# Patient Record
Sex: FEMALE | Race: WHITE | NOT HISPANIC OR LATINO | Employment: OTHER | URBAN - METROPOLITAN AREA
[De-identification: names, ages, dates, MRNs, and addresses within clinical notes are randomized per-mention and may not be internally consistent; named-entity substitution may affect disease eponyms.]

---

## 2018-09-13 PROBLEM — Z86.0100 HISTORY OF COLON POLYPS: Status: ACTIVE | Noted: 2018-09-13

## 2024-01-31 ENCOUNTER — OFFICE VISIT (OUTPATIENT)
Dept: URGENT CARE | Facility: CLINIC | Age: 79
End: 2024-01-31
Payer: MEDICARE

## 2024-01-31 VITALS
TEMPERATURE: 98 F | HEART RATE: 62 BPM | WEIGHT: 128.06 LBS | OXYGEN SATURATION: 99 % | RESPIRATION RATE: 19 BRPM | HEIGHT: 55 IN | SYSTOLIC BLOOD PRESSURE: 135 MMHG | DIASTOLIC BLOOD PRESSURE: 74 MMHG | BODY MASS INDEX: 29.64 KG/M2

## 2024-01-31 DIAGNOSIS — R39.9 UTI SYMPTOMS: Primary | ICD-10-CM

## 2024-01-31 LAB
BILIRUB UR QL STRIP: NEGATIVE
GLUCOSE UR QL STRIP: NEGATIVE
KETONES UR QL STRIP: NEGATIVE
LEUKOCYTE ESTERASE UR QL STRIP: NEGATIVE
PH, POC UA: 7.5 (ref 5–8)
POC BLOOD, URINE: NEGATIVE
POC NITRATES, URINE: NEGATIVE
PROT UR QL STRIP: NEGATIVE
SP GR UR STRIP: 1 (ref 1–1.03)
UROBILINOGEN UR STRIP-ACNC: NORMAL (ref 0.1–1.1)

## 2024-01-31 PROCEDURE — 81003 URINALYSIS AUTO W/O SCOPE: CPT | Mod: QW,S$GLB,, | Performed by: PHYSICIAN ASSISTANT

## 2024-01-31 PROCEDURE — 87086 URINE CULTURE/COLONY COUNT: CPT | Performed by: PHYSICIAN ASSISTANT

## 2024-01-31 PROCEDURE — 99213 OFFICE O/P EST LOW 20 MIN: CPT | Mod: S$GLB,,, | Performed by: PHYSICIAN ASSISTANT

## 2024-01-31 NOTE — PROGRESS NOTES
"Subjective:      Patient ID: Rehana Siddiqi is a 78 y.o. female.    Vitals:  height is 4' 6" (1.372 m) and weight is 58.1 kg (128 lb 1.4 oz). Her oral temperature is 97.9 °F (36.6 °C). Her blood pressure is 135/74 and her pulse is 62. Her respiration is 19 and oxygen saturation is 99%.     Chief Complaint: Dysuria    This is a 78 y.o. female who presents today with a chief complaint of odor in urine in the mornings for the last 3 weeks , patient daughter states her sodium level has been low and returns to primary in 3 weeks to get it checked . Denies flank pain, fever, nausea, confusion, frequency or urgency. She does admit that she does not drink much water as she does not like it    Dysuria   This is a new problem. The current episode started more than 1 month ago. Pertinent negatives include no flank pain, frequency, nausea, urgency or vomiting.       Constitution: Negative for fever.   Gastrointestinal:  Negative for nausea and vomiting.   Genitourinary:  Negative for dysuria, frequency, urgency and flank pain.        Urinary odor   Psychiatric/Behavioral:  Negative for confusion.       Objective:     Physical Exam   Constitutional: She is oriented to person, place, and time. She appears well-developed. She is cooperative.  Non-toxic appearance. She does not appear ill. No distress.   HENT:   Head: Normocephalic and atraumatic.   Ears:   Right Ear: Hearing normal.   Left Ear: Hearing normal.   Eyes: Conjunctivae and lids are normal. No scleral icterus.   Neck: Phonation normal. Neck supple.   Cardiovascular: Normal rate.   Pulmonary/Chest: Effort normal. No respiratory distress.   Abdominal: Normal appearance. She exhibits no distension and no mass. Soft. There is no abdominal tenderness. There is no rebound, no guarding, no left CVA tenderness and no right CVA tenderness. No hernia.   Neurological: She is alert and oriented to person, place, and time. Coordination normal.   Skin: Skin is intact, not " diaphoretic and not pale.   Psychiatric: Her speech is normal and behavior is normal. Judgment and thought content normal.   Nursing note and vitals reviewed.      Assessment:     1. UTI symptoms        Plan:       UTI symptoms  -     POCT Urinalysis, Dipstick, Automated, W/O Scope  -     CULTURE, URINE      Results for orders placed or performed in visit on 01/31/24   POCT Urinalysis, Dipstick, Automated, W/O Scope   Result Value Ref Range    POC Blood, Urine Negative Negative    POC Bilirubin, Urine Negative Negative    POC Urobilinogen, Urine norm 0.1 - 1.1    POC Ketones, Urine Negative Negative    POC Protein, Urine Negative Negative    POC Nitrates, Urine Negative Negative    POC Glucose, Urine Negative Negative    pH, UA 7.5 5 - 8    POC Specific Gravity, Urine 1.005 1.003 - 1.029    POC Leukocytes, Urine Negative Negative     Urged to increase her PO water intake. Will obtain culture to r/o infection due to patient's age and reported h/o UTI in the past. Will hold treatment pending culture results. Patient and daughter in agreement. Discussed with patient the importance of f/u with their primary care provider. Urged to go to the ER for any worsening signs or symptoms.       Medical Decision Making:   History:   I obtained history from: someone other than patient.       <> Summary of History: daughter

## 2024-02-05 ENCOUNTER — OFFICE VISIT (OUTPATIENT)
Dept: URGENT CARE | Facility: CLINIC | Age: 79
End: 2024-02-05
Payer: MEDICARE

## 2024-02-05 ENCOUNTER — TELEPHONE (OUTPATIENT)
Dept: URGENT CARE | Facility: CLINIC | Age: 79
End: 2024-02-05
Payer: MEDICARE

## 2024-02-05 NOTE — TELEPHONE ENCOUNTER
Pt confirmed name and . I informed the pt that her urine culture we sent to the lab was canceled and asked if she was treated by her primary care since the telephone encounters were a little unclear. She said she was but is feeling worse. I advised her that she can return to the clinic for re-evaluation. Pt confirmed understanding and had no further questions at this time.   145

## 2024-04-05 ENCOUNTER — OFFICE VISIT (OUTPATIENT)
Dept: URGENT CARE | Facility: CLINIC | Age: 79
End: 2024-04-05
Payer: MEDICARE

## 2024-04-05 VITALS
BODY MASS INDEX: 22.2 KG/M2 | OXYGEN SATURATION: 99 % | DIASTOLIC BLOOD PRESSURE: 78 MMHG | WEIGHT: 130 LBS | RESPIRATION RATE: 18 BRPM | TEMPERATURE: 98 F | HEIGHT: 64 IN | HEART RATE: 65 BPM | SYSTOLIC BLOOD PRESSURE: 148 MMHG

## 2024-04-05 DIAGNOSIS — L98.9 SORE ON LEG: Primary | ICD-10-CM

## 2024-04-05 PROCEDURE — 99213 OFFICE O/P EST LOW 20 MIN: CPT | Mod: S$GLB,,,

## 2024-04-05 RX ORDER — MUPIROCIN 20 MG/G
OINTMENT TOPICAL 3 TIMES DAILY
Qty: 22 G | Refills: 0 | Status: SHIPPED | OUTPATIENT
Start: 2024-04-05

## 2024-04-05 NOTE — PATIENT INSTRUCTIONS
You must understand that you have received treatment at an Urgent Care facility only, and that you may be  released before all of your medical problems are known or treated. Urgent Care facilities are not equipped to  handle life threatening emergencies. It is recommended that you seek care at an Emergency Department for  further evaluation of worsening or concerning symptoms, or possibly life threatening conditions as  discussed.    Keep area clean with gentle soap and water. Apply topical Bactroban. Follow up with Dermatology at [facility]: [phone]. Do not use hydrogen peroxide to wound. Wear your compression stockings. Go to Emergency Room for worsening symptoms, red streak marks from sight, purulent/tick foul smelling drainage, fever, vomiting, spreading or increase in size.     Patient Instructions   1.  Take all medications as directed. If you have been prescribed antibiotics, make sure to complete them.   2.  Rest and keep yourself/patient well hydrated. For adults, it is recommended to drink at least 8-10 glasses of water daily.   3.  For patients above 6 months of age who are not allergic to and are not on anticoagulants, you can alternate Tylenol and Motrin every 4-6 hours for fever above 100.4F and/or pain.  For patients less than 6 months of age, allergic to or intolerant to NSAIDS, have gastritis, gastric ulcers, or history of GI bleeds, are pregnant, or are on anticoagulant therapy, you can take Tylenol every 4 hours as needed for fever above 100.4F and/or pain.   4. You should schedule a follow-up appointment with your Primary Care Provider/Pediatrician for recheck in 2-3 days or as directed at this visit.   5.  If your condition fails to improve in a timely manner, you should receive another evaluation by your Primary Care Provider/Pediatrician to discuss your concerns or return to urgent care for a recheck.  If your condition worsens at any time, you should report immediately to your  nearest Emergency Department for further evaluation. **You must understand that you have received Urgent Care treatment only and that you may be released before all of your medical problems are known or treated. You, the patient, are responsible to arrange for follow-up care as instructed.

## 2024-04-05 NOTE — PROGRESS NOTES
"Subjective:      Patient ID: Rehana Siddiqi is a 78 y.o. female.    Vitals:  height is 5' 4" (1.626 m) and weight is 59 kg (130 lb). Her oral temperature is 97.8 °F (36.6 °C). Her blood pressure is 148/78 (abnormal) and her pulse is 65. Her respiration is 18 and oxygen saturation is 99%.     Chief Complaint: Sores    Pt states she broke out with two blisters on her left leg for over a month ago. Stated the area then turned to sores. Stated she may have scratched the area in her sleep. Stated it has not grown in size and no new sores.      Sores  This is a new problem. The current episode started more than 1 month ago (4 days ago). The problem occurs constantly. The problem has been gradually worsening. Associated symptoms include a rash. Pertinent negatives include no abdominal pain, anorexia, arthralgias, change in bowel habit, chest pain, chills, congestion, coughing, diaphoresis, fatigue, fever, headaches, joint swelling, myalgias, nausea, neck pain, numbness, sore throat, swollen glands, urinary symptoms, vertigo, visual change, vomiting or weakness. Nothing aggravates the symptoms. She has tried acetaminophen for the symptoms. The treatment provided no relief.       Constitution: Negative for chills, sweating, fatigue and fever.   HENT:  Negative for congestion and sore throat.    Neck: Negative for neck pain.   Cardiovascular:  Negative for chest pain.   Respiratory:  Negative for cough.    Gastrointestinal:  Negative for abdominal pain, nausea and vomiting.   Musculoskeletal:  Negative for joint pain, joint swelling and muscle ache.   Skin:  Positive for rash. Negative for erythema.   Neurological:  Negative for history of vertigo, headaches and numbness.      Objective:     Physical Exam   Constitutional: She is oriented to person, place, and time. She appears well-developed.  Non-toxic appearance. She does not appear ill. No distress.   HENT:   Head: Normocephalic and atraumatic. Head is without " abrasion, without contusion and without laceration.   Ears:   Right Ear: External ear normal.   Left Ear: External ear normal.   Nose: Nose normal.   Mouth/Throat: Oropharynx is clear and moist and mucous membranes are normal.   Eyes: Conjunctivae, EOM and lids are normal. Pupils are equal, round, and reactive to light.   Neck: Trachea normal and phonation normal. Neck supple.   Cardiovascular: Normal rate.   Pulmonary/Chest: Effort normal. No respiratory distress.   Musculoskeletal: Normal range of motion.         General: Normal range of motion.   Neurological: She is alert and oriented to person, place, and time.   Skin: Skin is warm, dry, intact, not diaphoretic and no rash. No abrasion, No burn, No bruising, No erythema and No ecchymosis        Psychiatric: Her speech is normal and behavior is normal. Judgment and thought content normal.   Nursing note and vitals reviewed.      Assessment:     1. Sore on leg        Plan:       Sore on leg  -     mupirocin (BACTROBAN) 2 % ointment; Apply topically 3 (three) times daily.  Dispense: 22 g; Refill: 0        Keep area clean with gentle soap and water. Apply topical Bactroban. Follow up with Dermatology at [facility]: [phone]. Do not use hydrogen peroxide to wound. Wear your compression stockings. Go to Emergency Room for worsening symptoms, red streak marks from sight, purulent/tick foul smelling drainage, fever, vomiting, spreading or increase in size.     Patient Instructions   1.  Take all medications as directed. If you have been prescribed antibiotics, make sure to complete them.   2.  Rest and keep yourself/patient well hydrated. For adults, it is recommended to drink at least 8-10 glasses of water daily.   3.  For patients above 6 months of age who are not allergic to and are not on anticoagulants, you can alternate Tylenol and Motrin every 4-6 hours for fever above 100.4F and/or pain.  For patients less than 6 months of age, allergic to or  intolerant to NSAIDS, have gastritis, gastric ulcers, or history of GI bleeds, are pregnant, or are on anticoagulant therapy, you can take Tylenol every 4 hours as needed for fever above 100.4F and/or pain.   4. You should schedule a follow-up appointment with your Primary Care Provider/Pediatrician for recheck in 2-3 days or as directed at this visit.   5.  If your condition fails to improve in a timely manner, you should receive another evaluation by your Primary Care Provider/Pediatrician to discuss your concerns or return to urgent care for a recheck.  If your condition worsens at any time, you should report immediately to your nearest Emergency Department for further evaluation. **You must understand that you have received Urgent Care treatment only and that you may be released before all of your medical problems are known or treated. You, the patient, are responsible to arrange for follow-up care as instructed.

## 2024-06-28 ENCOUNTER — OFFICE VISIT (OUTPATIENT)
Dept: URGENT CARE | Facility: CLINIC | Age: 79
End: 2024-06-28
Payer: MEDICARE

## 2024-06-28 VITALS
RESPIRATION RATE: 18 BRPM | DIASTOLIC BLOOD PRESSURE: 82 MMHG | OXYGEN SATURATION: 98 % | HEIGHT: 62 IN | WEIGHT: 126 LBS | TEMPERATURE: 98 F | HEART RATE: 66 BPM | SYSTOLIC BLOOD PRESSURE: 150 MMHG | BODY MASS INDEX: 23.19 KG/M2

## 2024-06-28 DIAGNOSIS — R30.0 DYSURIA: Primary | ICD-10-CM

## 2024-06-28 DIAGNOSIS — N39.0 URINARY TRACT INFECTION WITHOUT HEMATURIA, SITE UNSPECIFIED: ICD-10-CM

## 2024-06-28 LAB
BILIRUBIN, UA POC OHS: NEGATIVE
BLOOD, UA POC OHS: NEGATIVE
CLARITY, UA POC OHS: CLEAR
COLOR, UA POC OHS: YELLOW
GLUCOSE, UA POC OHS: NEGATIVE
KETONES, UA POC OHS: NEGATIVE
LEUKOCYTES, UA POC OHS: ABNORMAL
NITRITE, UA POC OHS: NEGATIVE
PH, UA POC OHS: 7.5
PROTEIN, UA POC OHS: NEGATIVE
SPECIFIC GRAVITY, UA POC OHS: 1.01
UROBILINOGEN, UA POC OHS: 0.2

## 2024-06-28 RX ORDER — PHENAZOPYRIDINE HYDROCHLORIDE 200 MG/1
200 TABLET, FILM COATED ORAL 3 TIMES DAILY PRN
Qty: 12 TABLET | Refills: 0 | Status: SHIPPED | OUTPATIENT
Start: 2024-06-28 | End: 2025-06-28

## 2024-06-28 RX ORDER — SULFAMETHOXAZOLE AND TRIMETHOPRIM 800; 160 MG/1; MG/1
1 TABLET ORAL 2 TIMES DAILY
Qty: 20 TABLET | Refills: 0 | Status: SHIPPED | OUTPATIENT
Start: 2024-06-28

## 2024-06-28 NOTE — LETTER
June 28, 2024  Rehana Nguyen  [address] [place] [address]                [facility]  [address]  [place] [address]  Phone: [phone]  Fax: [phone] Rehana Nguyen was seen and treated in our Urgent Care department on 6/28/2024. She may return to work in 2 - 3 days.      If you have any questions or concerns, please don't hesitate to call.        Sincerely,        Pro Crooks MD

## 2024-06-28 NOTE — PATIENT INSTRUCTIONS
.Please return here or go to the Emergency Department for any concerns or worsening of condition.  If you were prescribed antibiotics, please take them to completion.  If you were prescribed a narcotic medication, do not drive or operate heavy equipment or machinery while taking these medications.  Please follow up with your primary care doctor or specialist as needed.  Please drink plenty of fluids.  Please get plenty of rest.  If you were prescribed Pyridium (phenazopyridine), please be aware that if you wear contact lens that this medication may stain your contacts.  While taking this medication it is recommended that you do not wear your contacts until 24 hours after your last dose.    Push fluids aggressively to improve symptoms and wash through the infection.  Cranberry juice can help relieve symptoms  If you  smoke, please stop smoking.    Please follow up with your primary care doctor or specialist as needed.    Elisa Caraballo, NP  [phone]    You must understand that you have received treatment at an Urgent Care facility only, and that you may be  released before all of your medical problems are known or treated. Urgent Care facilities are not equipped to  handle life threatening emergencies. It is recommended that you seek care at an Emergency Department for  further evaluation of worsening or concerning symptoms, or possibly life threatening conditions as  discussed.    Bladder Infection, Female (Adult)    Urine is normally doesn't have any bacteria in it. But bacteria can get into the urinary tract from the skin around the rectum. Or they can travel in the blood from elsewhere in the body. Once they are in your urinary tract, they can cause infection in the urethra (urethritis), the bladder (cystitis), or the kidneys (pyelonephritis).  The most common place for an infection is in the bladder. This is called a bladder infection. This is one of the most common infections in women. Most bladder  "infections are easily treated. They are not serious unless the infection spreads to the kidney.  The phrases "bladder infection," "UTI," and "cystitis" are often used to describe the same thing. But they are not always the same. Cystitis is an inflammation of the bladder. The most common cause of cystitis is an infection.  Symptoms  The infection causes inflammation in the urethra and bladder. This causes many of the symptoms. The most common symptoms of a bladder infection are:  Pain or burning when urinating  Having to urinate more often than usual  Urgent need to urinate  Only a small amount of urine comes out  Blood in urine  Abdominal discomfort. This is usually in the lower abdomen above the pubic bone.  Cloudy urine  Strong- or bad-smelling urine  Unable to urinate (urinary retention)  Unable to hold urine in (urinary incontinence)  Fever  Loss of appetite  Confusion (in older adults)  Causes  Bladder infections are not contagious. You can't get one from someone else, from a toilet seat, or from sharing a bath.  The most common cause of bladder infections is bacteria from the bowels. The bacteria get onto the skin around the opening of the urethra. From there, they can get into the urine and travel up to the bladder, causing inflammation and infection. This usually happens because of:  Wiping improperly after urinating. Always wipe from front to back.  Bowel incontinence  Pregnancy  Procedures such as having a catheter inserted  Older age  Not emptying your bladder. This can allow bacteria a chance to grow in your urine.  Dehydration  Constipation  Sex  Use of a diaphragm for birth control   Treatment  Bladder infections are diagnosed by a urine test. They are treated with antibiotics and usually clear up quickly without complications. Treatment helps prevent a more serious kidney infection.  Medicines  Medicines can help in the treatment of a bladder infection:  Take antibiotics until they are used up, even " if you feel better. It is important to finish them to make sure the infection has cleared.  You can use acetaminophen or ibuprofen for pain, fever, or discomfort, unless another medicine was prescribed. If you have chronic liver or kidney disease, talk with your healthcare provider before using these medicines. Also talk with your provider if you've ever had a stomach ulcer or gastrointestinal bleeding, or are taking blood-thinner medicines.  If you are given phenazopydridine to reduce burning with urination, it will cause your urine to become a bright orange color. This can stain clothing.  Care and prevention  These self-care steps can help prevent future infections:  Drink plenty of fluids to prevent dehydration and flush out your bladder. Do this unless you must restrict fluids for other health reasons, or your doctor told you not to.  Proper cleaning after going to the bathroom is important. Wipe from front to back after using the toilet to prevent the spread of bacteria.  Urinate more often. Don't try to hold urine in for a long time.  Wear loose-fitting clothes and cotton underwear. Avoid tight-fitting pants.  Improve your diet and prevent constipation. Eat more fresh fruit and vegetables, and fiber, and less junk and fatty foods.  Avoid sex until your symptoms are gone.  Avoid caffeine, alcohol, and spicy foods. These can irritate your bladder.  Urinate right after intercourse to flush out your bladder.  If you use birth control pills and have frequent bladder infections, discuss it with your doctor.  Follow-up care  Call your healthcare provider if all symptoms are not gone after 3 days of treatment. This is especially important if you have repeat infections.  If a culture was done, you will be told if your treatment needs to be changed. If directed, you can call to find out the results.  If X-rays were done, you will be told if the results will affect your treatment.  Call 911  Call 911 if any of the  following occur:  Trouble breathing  Hard to wake up or confusion  Fainting or loss of consciousness  Rapid heart rate  When to seek medical advice  Call your healthcare provider right away if any of these occur:  Fever of 100.4ºF (38.0ºC) or higher, or as directed by your healthcare provider  Symptoms are not better by the third day of treatment  Back or belly (abdominal) pain that gets worse  Repeated vomiting, or unable to keep medicine down  Weakness or dizziness  Vaginal discharge  Pain, redness, or swelling in the outer vaginal area (labia)  Date Last Reviewed: 10/1/2016  © 5667-6685 [employer]. [address] [place] [address]. All rights reserved. This information is not intended as a substitute for professional medical care. Always follow your healthcare professional's instructions.

## 2024-06-28 NOTE — PROGRESS NOTES
"Subjective:      Patient ID: Rehana Nguyen is a 78 y.o. female.    Vitals:  height is 5' 2" (1.575 m) and weight is 57.2 kg (126 lb). Her oral temperature is 98.3 °F (36.8 °C). Her blood pressure is 150/82 (abnormal) and her pulse is 66. Her respiration is 18 and oxygen saturation is 98%.     Chief Complaint: Dysuria    Pt reports starting yesterday with pressure after urination and withholding with a strong odor.    Dysuria   This is a new problem. The current episode started yesterday. The problem occurs every urination. The problem has been unchanged. Quality: Pressure. The pain is at a severity of 8/10. The pain is moderate. There has been no fever. The fever has been present for Less than 1 day. She is Not sexually active. There is No history of pyelonephritis. Associated symptoms include hesitancy and withholding. Pertinent negatives include no discharge, flank pain, nausea, vomiting or bubble bath use. She has tried nothing for the symptoms. The treatment provided no relief. Her past medical history is significant for hypertension. There is no history of kidney stones.       Constitution: Negative.   HENT: Negative.     Cardiovascular: Negative.    Eyes: Negative.    Respiratory: Negative.     Gastrointestinal: Negative.  Negative for nausea and vomiting.   Endocrine: negative.   Genitourinary:  Positive for dysuria. Negative for flank pain.   Musculoskeletal: Negative.    Skin: Negative.    Allergic/Immunologic: Negative.    Neurological: Negative.    Hematologic/Lymphatic: Negative.    Psychiatric/Behavioral: Negative.        Objective:     Physical Exam   Constitutional: She is oriented to person, place, and time. She appears well-developed.   HENT:   Head: Normocephalic and atraumatic.   Ears:   Right Ear: External ear normal.   Left Ear: External ear normal.   Nose: Nose normal. No nasal deformity. No epistaxis.   Mouth/Throat: Oropharynx is clear and moist and mucous membranes are normal.   Eyes: " Lids are normal.   Neck: Trachea normal and phonation normal. Neck supple.   Cardiovascular: Normal pulses.   Pulmonary/Chest: Effort normal.   Abdominal: Normal appearance and bowel sounds are normal. She exhibits no distension. Soft. There is abdominal tenderness in the suprapubic area. There is no rigidity, no rebound, no guarding, no tenderness at McBurney's point and negative Drummond's sign.   Neurological: She is alert and oriented to person, place, and time.   Skin: Skin is warm, dry and intact.   Psychiatric: Her speech is normal and behavior is normal.   Nursing note and vitals reviewed.    Results for orders placed or performed in visit on 06/28/24   POCT Urinalysis(Instrument)   Result Value Ref Range    Color, POC UA Yellow Yellow, Straw, Colorless    Clarity, POC UA Clear Clear    Glucose, POC UA Negative Negative    Bilirubin, POC UA Negative Negative    Ketones, POC UA Negative Negative    Spec Grav POC UA 1.015 1.005 - 1.030    Blood, POC UA Negative Negative    pH, POC UA 7.5 5.0 - 8.0    Protein, POC UA Negative Negative    Urobilinogen, POC UA 0.2 <=1.0    Nitrite, POC UA Negative Negative    WBC, POC UA Trace (A) Negative       Assessment:     1. Dysuria    2. Urinary tract infection without hematuria, site unspecified        Plan:       Dysuria  -     POCT Urinalysis(Instrument)    Urinary tract infection without hematuria, site unspecified  -     sulfamethoxazole-trimethoprim 800-160mg (BACTRIM DS) 800-160 mg Tab; Take 1 tablet by mouth 2 (two) times daily.  Dispense: 20 tablet; Refill: 0  -     phenazopyridine (PYRIDIUM) 200 MG tablet; Take 1 tablet (200 mg total) by mouth 3 (three) times daily as needed for Pain.  Dispense: 12 tablet; Refill: 0    Please return here or go to the Emergency Department for any concerns or worsening of condition.  If you were prescribed antibiotics, please take them to completion.  If you were prescribed a narcotic medication, do not drive or operate heavy  equipment or machinery while taking these medications.  Please follow up with your primary care doctor or specialist as needed.  Please drink plenty of fluids.  Please get plenty of rest.  If you were prescribed Pyridium (phenazopyridine), please be aware that if you wear contact lens that this medication may stain your contacts.  While taking this medication it is recommended that you do not wear your contacts until 24 hours after your last dose.    Push fluids aggressively to improve symptoms and wash through the infection.  Cranberry juice can help relieve symptoms  If you  smoke, please stop smoking.    Please follow up with your primary care doctor or specialist as needed.    Elisa Caraballo, NP  [phone]    You must understand that you have received treatment at an Urgent Care facility only, and that you may be  released before all of your medical problems are known or treated. Urgent Care facilities are not equipped to  handle life threatening emergencies. It is recommended that you seek care at an Emergency Department for  further evaluation of worsening or concerning symptoms, or possibly life threatening conditions as  discussed.

## 2024-07-02 ENCOUNTER — OFFICE VISIT (OUTPATIENT)
Dept: URGENT CARE | Facility: CLINIC | Age: 79
End: 2024-07-02
Payer: MEDICARE

## 2024-07-02 VITALS
OXYGEN SATURATION: 100 % | BODY MASS INDEX: 23.21 KG/M2 | HEART RATE: 63 BPM | TEMPERATURE: 97 F | WEIGHT: 126.13 LBS | SYSTOLIC BLOOD PRESSURE: 131 MMHG | DIASTOLIC BLOOD PRESSURE: 80 MMHG | RESPIRATION RATE: 20 BRPM | HEIGHT: 62 IN

## 2024-07-02 DIAGNOSIS — K12.1 MOUTH ULCERS: Primary | ICD-10-CM

## 2024-07-02 PROCEDURE — 99213 OFFICE O/P EST LOW 20 MIN: CPT | Mod: S$GLB,,, | Performed by: NURSE PRACTITIONER

## 2024-07-02 RX ORDER — NYSTATIN 100000 [USP'U]/ML
4 SUSPENSION ORAL 4 TIMES DAILY
Qty: 160 ML | Refills: 0 | Status: SHIPPED | OUTPATIENT
Start: 2024-07-02 | End: 2024-07-12

## 2024-07-02 NOTE — PROGRESS NOTES
"Subjective:      Patient ID: Rehana Nguyen is a 78 y.o. female.    Vitals:  height is 5' 2" (1.575 m) and weight is 57.2 kg (126 lb 1.7 oz). Her tympanic temperature is 96.8 °F (36 °C). Her blood pressure is 131/80 and her pulse is 63. Her respiration is 20 and oxygen saturation is 100%.     Chief Complaint: Oral Pain    Pt here with c/o mouth ulcers and blisters. States symptoms started after starting Bactrim that was prescribed Friday for UTI. States she requested liquid medication but tablets were sent so she cut them in half to try and swallow them. Has dysphagia due to esophageal stricture. Had recent EGD and dilation.  In review of her chart, PCP changed medication to Cefdinir elixir. UTI symptoms are improving. She has f/u appt with urology scheduled.     Oral Pain   This is a new problem. The current episode started in the past 7 days. The problem occurs constantly. The problem has been gradually worsening. The pain is at a severity of 6/10. The pain is mild. Associated symptoms include difficulty swallowing. Pertinent negatives include no fever, oral bleeding or thermal sensitivity. Associated symptoms comments: Burning in her mouth. Treatments tried: canker x mouth sore gel. The treatment provided mild relief.       Constitution: Negative for fever.      Objective:     Physical Exam   Constitutional: She is cooperative.  Non-toxic appearance. No distress.   HENT:   Head: Atraumatic.   Ears:   Right Ear: External ear normal.   Left Ear: External ear normal.   Mouth/Throat: Uvula is midline and mucous membranes are normal. Mucous membranes are moist. Oral lesions present.       Neurological: She is alert.   Skin: Skin is not diaphoretic.   Nursing note and vitals reviewed.chaperone present         Assessment:     1. Mouth ulcers        Plan:       Mouth ulcers  -     nystatin (MYCOSTATIN) 100,000 unit/mL suspension; Take 4 mLs (400,000 Units total) by mouth 4 (four) times daily. for 10 days  Dispense: " 160 mL; Refill: 0

## 2024-07-02 NOTE — PATIENT INSTRUCTIONS
Take all medications as directed.    f your condition fails to improve in a timely manner, you should receive another evaluation by your Primary Care Provider/Pediatrician to discuss your concerns or return to urgent care for a recheck.  If your condition worsens at any time, you should report immediately to your nearest Emergency Department for further evaluation. **You must understand that you have received Urgent Care treatment only and that you may be released before all of your medical problems are known or treated. You, the patient, are responsible to arrange for follow-up care as instructed.

## 2024-09-16 PROBLEM — D72.819 LEUKOPENIA: Chronic | Status: RESOLVED | Noted: 2019-06-01 | Resolved: 2024-09-16

## 2024-09-16 PROBLEM — R13.10 DYSPHAGIA: Status: RESOLVED | Noted: 2024-02-12 | Resolved: 2024-09-16

## 2024-09-16 PROBLEM — E88.810 METABOLIC SYNDROME: Chronic | Status: RESOLVED | Noted: 2019-06-04 | Resolved: 2024-09-16

## 2024-09-16 PROBLEM — D51.0 PERNICIOUS ANEMIA: Chronic | Status: ACTIVE | Noted: 2019-06-04

## 2024-12-31 ENCOUNTER — OFFICE VISIT (OUTPATIENT)
Dept: URGENT CARE | Facility: CLINIC | Age: 79
End: 2024-12-31
Payer: MEDICARE

## 2024-12-31 VITALS
TEMPERATURE: 99 F | SYSTOLIC BLOOD PRESSURE: 160 MMHG | OXYGEN SATURATION: 97 % | HEART RATE: 73 BPM | DIASTOLIC BLOOD PRESSURE: 78 MMHG | BODY MASS INDEX: 20.88 KG/M2 | WEIGHT: 113.44 LBS | RESPIRATION RATE: 16 BRPM | HEIGHT: 62 IN

## 2024-12-31 DIAGNOSIS — Z20.828 EXPOSURE TO THE FLU: ICD-10-CM

## 2024-12-31 DIAGNOSIS — J06.9 VIRAL UPPER RESPIRATORY ILLNESS: ICD-10-CM

## 2024-12-31 DIAGNOSIS — R05.9 COUGH, UNSPECIFIED TYPE: Primary | ICD-10-CM

## 2024-12-31 LAB
CTP QC/QA: YES
POC MOLECULAR INFLUENZA A AGN: NEGATIVE
POC MOLECULAR INFLUENZA B AGN: NEGATIVE

## 2024-12-31 PROCEDURE — 87502 INFLUENZA DNA AMP PROBE: CPT | Mod: QW,S$GLB,,

## 2024-12-31 PROCEDURE — 99213 OFFICE O/P EST LOW 20 MIN: CPT | Mod: S$GLB,,,

## 2024-12-31 RX ORDER — BENZONATATE 200 MG/1
200 CAPSULE ORAL 3 TIMES DAILY PRN
Qty: 30 CAPSULE | Refills: 0 | Status: SHIPPED | OUTPATIENT
Start: 2024-12-31 | End: 2025-01-10

## 2024-12-31 RX ORDER — OSELTAMIVIR PHOSPHATE 75 MG/1
75 CAPSULE ORAL 2 TIMES DAILY
Qty: 10 CAPSULE | Refills: 0 | Status: SHIPPED | OUTPATIENT
Start: 2024-12-31 | End: 2025-01-05

## 2024-12-31 NOTE — PATIENT INSTRUCTIONS
You must understand that you have received treatment at an Urgent Care facility only, and that you may be  released before all of your medical problems are known or treated. Urgent Care facilities are not equipped to  handle life threatening emergencies. It is recommended that you seek care at an Emergency Department for  further evaluation of worsening or concerning symptoms, or possibly life threatening conditions as  discussed.    Your blood pressure was elevated today. It's important to follow up with a primary care provider (PCP) within 2 weeks to see if you need further tests or treatment. Failure to follow up with a PCP about elevated blood pressure can result in disability or even death.     UPPER RESPIRATORY INFECTIONS (COMMON COLD)    An upper respiratory infection is also called a cold. It can affect your nose, throat, ears, and sinuses.    A cold is contagious and may be spread to others through coughing, sneezing, or close contact. It can also stay on objects and surfaces. You can become infected if you touch the object or surface and then touch your eyes, mouth, or nose.    Colds are caused by viruses and do not get better with antibiotics. Most people get better in 7 to 14 days. You may continue to cough for 2 to 3 weeks.      Criteria for antibiotics include:  Upper respiratory infections lasting longer than 10-14 days without improvement.  New or worsening symptoms after 5 to 7 days  Worsening symptoms after initial improvement.   Co-existing infection such as Acute Otitis Media (ear infection).     The use of antibiotics for nonbacterial upper respiratory infections has resulted in a severe problem with the emergence of bacteria which are resistant to multiple forms of antibiotics, and some bacteria are currently only treatable with intravenous antibiotics.    The following are suggestions to help you with upper respiratory symptoms.    Body Aches/Pains/Fever  For patients who are not allergic to  and are not on anticoagulants, you can alternate Tylenol every 4 hours and Motrin every 6 hours for fever above 100.4F and/or pain.  For patients who are allergic or intolerant to NSAIDS, have gastritis, gastric ulcers, or history of GI bleeds, are pregnant, or are on anticoagulant therapy, you can take Tylenol every 4 hours as needed for fever above 100.4F and/or pain.    Congestion:    Nasal Saline   Nasal saline is available over the counter. There are several different commercial preparations such as Ocean spray and Ayr spray. There is no limit on the use of Nasal saline. Saline is used by snorting the mist up into the nose then later gently blowing the nose to get rid of any secretions that it has loosened.    Nasal irrigation   Flushing the nose and sinuses with a saline solution several times per day can decrease pain associated with congestion and shorten the duration of symptoms.     Decongestant Nasal Sprays  Over-the-counter decongestant nasal spray such as Afrin, may be helpful as an initial step in treating upper respiratory infections. This spray can be used for up to approximately 3 days and is used no more than twice per day. Topical nasal decongestant spray for longer than 5 days will result in a physical addiction, in which the nasal lining will become significantly swollen and irritated until the spray is used again. They May also cause elevated blood pressure.    Nasal Steroids  Nasal steroids, such as Flonase, can be beneficial and help reduce swelling inside the nose. These drugs have few side effects and relieve symptoms in most people. Unfortunately, they do not begin to work for 2-3 days and do not reach their maximum benefit for approximately 2-3 weeks.   There are several nasal steroids available by prescription as well as a few that can be purchased without a prescription (over the counter). These drugs are all effective but differ in how frequently they must be used and how much they  cost.    Nasal anticholinergics  Ipratropium bromide (nasal spray) is available by prescription and can be very effective in decreasing the symptom of runny nose and other related symptoms (eg, post-nasal drainage, sore throat). These sprays, like all medications, can interact with other medications, so it is important that your complete medication list be reviewed by your physician before you take this medication.    If you develop a bloody nose, stop using the medication immediately.    Oral Decongestant  Use pseudoephedrine (behind the counter) for sinus pressure and congestion- Pseudoephedrine 30 mg up to 240 mg /day. Common brands include Sudafed, Zephrex-D Wal-phed.  Warning: It can raise your blood pressure and give you palpitations, avoid with history of high blood pressure, palpitations, and severe cardiac disease.  Coricidin HBP is okay to use if you have high blood pressure.     Mucous Thinners/Decongestant Combination   Mucous thinners and decongestants are used to shrink down the tissues and promote sinus drainage. There are multiple prescription and over-the-counter medications available. A mucous thinner will tend to be drying unless you are also drinking plenty of water when taking these. If you have high blood pressure, it is very important to monitor your pressure while on decongestants. The mucous thinner/decongestant combinations are typically given twice per day. However, some people will be unable to tolerate these at night and should only take them once per day.    Clear Runny Nose/Allergic Rhinitis:  Use an antihistamine to help dry you out or nasal anticholinergics as mentioned above.     Antihistamines  Antihistamines are available both over the counter and as a prescription. There are also various decongestant and antihistamine combinations available such as Claritin, Allegra, and Zyrtec. It is best to take any antihistamine-decongestant combination in the morning to avoid insomnia. Zyrtec  should probably be taken at night, to reduce the chance of sleepiness during the daytime. If there is a significant infection present and secretions are already thickened, it is recommended to discontinue antihistamines and use a mucous thinner/decongestant combination.    Body Aches/Pains/Fever  For patients who are not allergic to and are not on anticoagulants, you can alternate Tylenol every 4 hours and Motrin every 6 hours for fever above 100.4F and/or pain.  For patients who are allergic or intolerant to NSAIDS, have gastritis, gastric ulcers, or history of GI bleeds, are pregnant, or are on anticoagulant therapy, you can take Tylenol every 4 hours as needed for fever above 100.4F and/or pain.     Maintain adequate hydration - Rest and keep yourself/patient well hydrated. For adults, it is recommended to drink at least 8 glasses of water daily.  This may help thin secretions and soothe the respiratory mucosa.     Sore Throat  Perform warm, saltwater gargles (1/2 tsp salt to 1 cup warm water) to help reduce inflammation and throat discomfort. Chloraseptic sprays and throat lozenges will also help with your throat pain.    COUGH  A viral cough may linger for 3 to 4 weeks but should steadily improve over time. Coughing is the body's natural way to clear mucus and help get rid of bacteria and viruses. Therefore, cough suppressants are usually not recommended.      Use Mucinex (guaifenesin) up to 2400mg/day to help clear and break up/loosen mucus/congestion from the chest when you have a cold or flu.      Common cough suppressants include the ingredient dextromethorphan or DM, (such as Mucinex DM) available over the counter and can be used for cough to stop the tickle in the back of your throat.      ? Honey may be beneficial, especially on nocturnal cough 1 to 2 teaspoons can be taken straight or diluted in tea, juice or other liquid.    The antioxidants in honey are an important contributor to its decongestant  properties. Darker honey contains more antioxidants. Buckwheat and avocado honey are particularly good choices. If these honeys are not available in your area, choose the darkest honey you can find.    It is important to follow up for Re-evaluation if new or worsening symptoms develop or symptoms exceed the expected duration of common cold.     Worsening or persistent symptoms may indicate the development of complications or the need to consider a diagnosis other than the common cold requiring an antibiotic. (eg, acute bacterial sinusitis, pneumonia, pertussis).    Go to Emergency Department or call 911 if you develop new or worsening symptoms including but not limited to:  Trouble breathing.  New or worsening chest discomfort.  Feel confused or disoriented.  Vomiting and can't keep liquids down.  Develop signs of fluid loss, such as dark-colored urine and muscle cramps.    **You must understand that you have received Urgent Care treatment only and that you may be released before all your medical problems are known or treated. You, the patient, are responsible to arrange for follow-up care as instructed.

## 2024-12-31 NOTE — PROGRESS NOTES
"Subjective:      Patient ID: Rehana Nguyen is a 79 y.o. female.    Vitals:  height is 5' 2" (1.575 m) and weight is 51.5 kg (113 lb 6.8 oz). Her oral temperature is 98.5 °F (36.9 °C). Her blood pressure is 160/78 (abnormal) and her pulse is 73. Her respiration is 16 and oxygen saturation is 97%.     Chief Complaint: Cough    Pt complains of cough that started last night. She said her cough kept her up last night. Pt denies any other symptoms. Pt was exposed to flu last week.     Cough  This is a new problem. The current episode started yesterday. The problem has been waxing and waning. The problem occurs constantly. The cough is Non-productive. Pertinent negatives include no fever or sore throat. Treatments tried: tylenol, cough drop. There is no history of asthma, COPD or pneumonia.       Constitution: Negative for fever.   HENT:  Negative for congestion and sore throat.    Respiratory:  Positive for cough. Negative for sputum production.    Gastrointestinal:  Negative for nausea, vomiting and diarrhea.      Objective:     Physical Exam   Constitutional:  Non-toxic appearance. She does not appear ill. No distress.   HENT:   Head: Normocephalic and atraumatic.   Ears:   Right Ear: Tympanic membrane, external ear and ear canal normal.   Left Ear: Tympanic membrane, external ear and ear canal normal.   Nose: No rhinorrhea or congestion.   Mouth/Throat: Uvula is midline and oropharynx is clear and moist. Mucous membranes are moist. She has dentures. No posterior oropharyngeal edema or posterior oropharyngeal erythema. Oropharynx is clear.   Eyes: Conjunctivae are normal.   Neck: Neck supple. No neck rigidity present.   Cardiovascular: Normal rate and regular rhythm.   Pulmonary/Chest: Effort normal. No respiratory distress. She has no wheezes. She has no rhonchi.   Abdominal: Normal appearance.   Neurological: no focal deficit. She is alert.   Skin: Skin is warm, dry and not diaphoretic.   Psychiatric: Her behavior " is normal. Judgment and thought content normal.   Nursing note and vitals reviewed.    Results for orders placed or performed in visit on 12/31/24   POCT Influenza A/B MOLECULAR    Collection Time: 12/31/24 12:32 PM   Result Value Ref Range    POC Molecular Influenza A Ag Negative Negative    POC Molecular Influenza B Ag Negative Negative     Acceptable Yes          Assessment:     1. Cough, unspecified type    2. Exposure to the flu    3. Viral upper respiratory illness        Plan:       Cough, unspecified type  -     POCT Influenza A/B MOLECULAR    Exposure to the flu  -     oseltamivir (TAMIFLU) 75 MG capsule; Take 1 capsule (75 mg total) by mouth 2 (two) times daily. for 5 days  Dispense: 10 capsule; Refill: 0    Viral upper respiratory illness  -     benzonatate (TESSALON) 200 MG capsule; Take 1 capsule (200 mg total) by mouth 3 (three) times daily as needed for Cough.  Dispense: 30 capsule; Refill: 0      Flu is engative, discussed reuslt siwht patient.   UPPER RESPIRATORY INFECTIONS (COMMON COLD)    An upper respiratory infection is also called a cold. It can affect your nose, throat, ears, and sinuses.    A cold is contagious and may be spread to others through coughing, sneezing, or close contact. It can also stay on objects and surfaces. You can become infected if you touch the object or surface and then touch your eyes, mouth, or nose.    Colds are caused by viruses and do not get better with antibiotics. Most people get better in 7 to 14 days. You may continue to cough for 2 to 3 weeks.      Criteria for antibiotics include:  Upper respiratory infections lasting longer than 10-14 days without improvement.  New or worsening symptoms after 5 to 7 days  Worsening symptoms after initial improvement.   Co-existing infection such as Acute Otitis Media (ear infection).     The use of antibiotics for nonbacterial upper respiratory infections has resulted in a severe problem with the emergence of  bacteria which are resistant to multiple forms of antibiotics, and some bacteria are currently only treatable with intravenous antibiotics.    The following are suggestions to help you with upper respiratory symptoms.    Body Aches/Pains/Fever  For patients who are not allergic to and are not on anticoagulants, you can alternate Tylenol every 4 hours and Motrin every 6 hours for fever above 100.4F and/or pain.  For patients who are allergic or intolerant to NSAIDS, have gastritis, gastric ulcers, or history of GI bleeds, are pregnant, or are on anticoagulant therapy, you can take Tylenol every 4 hours as needed for fever above 100.4F and/or pain.    Congestion:    Nasal Saline   Nasal saline is available over the counter. There are several different commercial preparations such as Ocean spray and Ayr spray. There is no limit on the use of Nasal saline. Saline is used by snorting the mist up into the nose then later gently blowing the nose to get rid of any secretions that it has loosened.    Nasal irrigation   Flushing the nose and sinuses with a saline solution several times per day can decrease pain associated with congestion and shorten the duration of symptoms.     Decongestant Nasal Sprays  Over-the-counter decongestant nasal spray such as Afrin, may be helpful as an initial step in treating upper respiratory infections. This spray can be used for up to approximately 3 days and is used no more than twice per day. Topical nasal decongestant spray for longer than 5 days will result in a physical addiction, in which the nasal lining will become significantly swollen and irritated until the spray is used again. They May also cause elevated blood pressure.    Nasal Steroids  Nasal steroids, such as Flonase, can be beneficial and help reduce swelling inside the nose. These drugs have few side effects and relieve symptoms in most people. Unfortunately, they do not begin to work for 2-3 days and do not reach their  maximum benefit for approximately 2-3 weeks.   There are several nasal steroids available by prescription as well as a few that can be purchased without a prescription (over the counter). These drugs are all effective but differ in how frequently they must be used and how much they cost.    Nasal anticholinergics  Ipratropium bromide (nasal spray) is available by prescription and can be very effective in decreasing the symptom of runny nose and other related symptoms (eg, post-nasal drainage, sore throat). These sprays, like all medications, can interact with other medications, so it is important that your complete medication list be reviewed by your physician before you take this medication.    If you develop a bloody nose, stop using the medication immediately.    Oral Decongestant  Use pseudoephedrine (behind the counter) for sinus pressure and congestion- Pseudoephedrine 30 mg up to 240 mg /day. Common brands include Sudafed, Zephrex-D Wal-phed.  Warning: It can raise your blood pressure and give you palpitations, avoid with history of high blood pressure, palpitations, and severe cardiac disease.  Coricidin HBP is okay to use if you have high blood pressure.     Mucous Thinners/Decongestant Combination   Mucous thinners and decongestants are used to shrink down the tissues and promote sinus drainage. There are multiple prescription and over-the-counter medications available. A mucous thinner will tend to be drying unless you are also drinking plenty of water when taking these. If you have high blood pressure, it is very important to monitor your pressure while on decongestants. The mucous thinner/decongestant combinations are typically given twice per day. However, some people will be unable to tolerate these at night and should only take them once per day.    Clear Runny Nose/Allergic Rhinitis:  Use an antihistamine to help dry you out or nasal anticholinergics as mentioned above.      Antihistamines  Antihistamines are available both over the counter and as a prescription. There are also various decongestant and antihistamine combinations available such as Claritin, Allegra, and Zyrtec. It is best to take any antihistamine-decongestant combination in the morning to avoid insomnia. Zyrtec should probably be taken at night, to reduce the chance of sleepiness during the daytime. If there is a significant infection present and secretions are already thickened, it is recommended to discontinue antihistamines and use a mucous thinner/decongestant combination.    Body Aches/Pains/Fever  For patients who are not allergic to and are not on anticoagulants, you can alternate Tylenol every 4 hours and Motrin every 6 hours for fever above 100.4F and/or pain.  For patients who are allergic or intolerant to NSAIDS, have gastritis, gastric ulcers, or history of GI bleeds, are pregnant, or are on anticoagulant therapy, you can take Tylenol every 4 hours as needed for fever above 100.4F and/or pain.     Maintain adequate hydration - Rest and keep yourself/patient well hydrated. For adults, it is recommended to drink at least 8 glasses of water daily.  This may help thin secretions and soothe the respiratory mucosa.     Sore Throat  Perform warm, saltwater gargles (1/2 tsp salt to 1 cup warm water) to help reduce inflammation and throat discomfort. Chloraseptic sprays and throat lozenges will also help with your throat pain.    COUGH  A viral cough may linger for 3 to 4 weeks but should steadily improve over time. Coughing is the body's natural way to clear mucus and help get rid of bacteria and viruses. Therefore, cough suppressants are usually not recommended.      Use Mucinex (guaifenesin) up to 2400mg/day to help clear and break up/loosen mucus/congestion from the chest when you have a cold or flu.      Common cough suppressants include the ingredient dextromethorphan or DM, (such as Mucinex DM) available  over the counter and can be used for cough to stop the tickle in the back of your throat.      ? Honey may be beneficial, especially on nocturnal cough 1 to 2 teaspoons can be taken straight or diluted in tea, juice or other liquid.    The antioxidants in honey are an important contributor to its decongestant properties. Darker honey contains more antioxidants. Buckwheat and avocado honey are particularly good choices. If these honeys are not available in your area, choose the darkest honey you can find.    It is important to follow up for Re-evaluation if new or worsening symptoms develop or symptoms exceed the expected duration of common cold.     Worsening or persistent symptoms may indicate the development of complications or the need to consider a diagnosis other than the common cold requiring an antibiotic. (eg, acute bacterial sinusitis, pneumonia, pertussis).    Go to Emergency Department or call 911 if you develop new or worsening symptoms including but not limited to:  Trouble breathing.  New or worsening chest discomfort.  Feel confused or disoriented.  Vomiting and can't keep liquids down.  Develop signs of fluid loss, such as dark-colored urine and muscle cramps.    **You must understand that you have received Urgent Care treatment only and that you may be released before all your medical problems are known or treated. You, the patient, are responsible to arrange for follow-up care as instructed. '

## 2025-07-07 ENCOUNTER — OFFICE VISIT (OUTPATIENT)
Dept: URGENT CARE | Facility: CLINIC | Age: 80
End: 2025-07-07
Payer: MEDICARE

## 2025-07-07 VITALS
HEART RATE: 64 BPM | DIASTOLIC BLOOD PRESSURE: 70 MMHG | HEIGHT: 64 IN | TEMPERATURE: 98 F | OXYGEN SATURATION: 97 % | BODY MASS INDEX: 18.12 KG/M2 | RESPIRATION RATE: 17 BRPM | WEIGHT: 106.13 LBS | SYSTOLIC BLOOD PRESSURE: 156 MMHG

## 2025-07-07 DIAGNOSIS — N39.0 URINARY TRACT INFECTION WITHOUT HEMATURIA, SITE UNSPECIFIED: Primary | ICD-10-CM

## 2025-07-07 DIAGNOSIS — N89.8 VAGINAL ITCHING: ICD-10-CM

## 2025-07-07 DIAGNOSIS — R03.0 ELEVATED BLOOD PRESSURE READING: ICD-10-CM

## 2025-07-07 LAB
BILIRUBIN, UA POC OHS: NEGATIVE
BLOOD, UA POC OHS: NEGATIVE
CLARITY, UA POC OHS: CLEAR
COLOR, UA POC OHS: YELLOW
GLUCOSE, UA POC OHS: NEGATIVE
KETONES, UA POC OHS: NEGATIVE
LEUKOCYTES, UA POC OHS: ABNORMAL
NITRITE, UA POC OHS: NEGATIVE
PH, UA POC OHS: 7
PROTEIN, UA POC OHS: ABNORMAL
SPECIFIC GRAVITY, UA POC OHS: 1.01
UROBILINOGEN, UA POC OHS: 1

## 2025-07-07 PROCEDURE — 81515 NFCT DS BV&VAGINITIS DNA ALG: CPT

## 2025-07-07 PROCEDURE — 87088 URINE BACTERIA CULTURE: CPT

## 2025-07-07 PROCEDURE — 81003 URINALYSIS AUTO W/O SCOPE: CPT | Mod: QW,S$GLB,,

## 2025-07-07 PROCEDURE — 99214 OFFICE O/P EST MOD 30 MIN: CPT | Mod: S$GLB,,,

## 2025-07-07 RX ORDER — ROSUVASTATIN CALCIUM 5 MG/1
TABLET, COATED ORAL
COMMUNITY

## 2025-07-07 RX ORDER — NITROFURANTOIN 25; 75 MG/1; MG/1
100 CAPSULE ORAL 2 TIMES DAILY
Qty: 10 CAPSULE | Refills: 0 | Status: SHIPPED | OUTPATIENT
Start: 2025-07-07 | End: 2025-07-12

## 2025-07-07 RX ORDER — ESCITALOPRAM OXALATE 10 MG/1
TABLET, FILM COATED ORAL
COMMUNITY
Start: 2025-03-12

## 2025-07-07 RX ORDER — FLUCONAZOLE 150 MG/1
150 TABLET ORAL DAILY
Qty: 2 TABLET | Refills: 0 | Status: SHIPPED | OUTPATIENT
Start: 2025-07-07 | End: 2025-07-09

## 2025-07-07 NOTE — PATIENT INSTRUCTIONS
1.  Take the Diflucan tablet today.  Macrobid has been prescribed for a possible urinary tract infection.  I would like to wait on the results of your culture before you begin taking this antibiotic.  However, if you have any worsening symptoms, please start taking the antibiotic as directed.  Consider following up with your urologist, if the bladder spasms continue.  See attached handout for more information regarding your condition and how to manage it.  2.  Rest and keep yourself/patient well hydrated.  3.  You can alternate Tylenol and Motrin every 4-6 hours for fever above 100.4F and/or pain.   4. You should schedule a follow-up appointment with your Primary Care Provider for recheck in 2-3 days or as directed at this visit.   5.  If your condition fails to improve in a timely manner, you should receive another evaluation by your Primary Care Provider to discuss your concerns or return to urgent care for a recheck.  If your condition worsens at any time, you should report immediately to your nearest Emergency Department for further evaluation. **You must understand that you have received Urgent Care treatment only and that you may be released before all of your medical problems are known or treated. You, the patient, are responsible to arrange for follow-up care as instructed.

## 2025-07-07 NOTE — PROGRESS NOTES
" Subjective:      Patient ID: Rehana Nguyen is a 79 y.o. female.    Vitals:  height is 5' 4" (1.626 m) and weight is 48.1 kg (106 lb 2.1 oz). Her oral temperature is 97.6 °F (36.4 °C). Her blood pressure is 156/70 (abnormal) and her pulse is 64. Her respiration is 17 and oxygen saturation is 97%.     Chief Complaint: Dysuria    78 yo F here with complaint of vaginal itching and bladder spasms that started yesterday.  She denies fever, confusion, nausea, and back pain.  She admits to urinary frequency, urgency and urinary incontinence but reports that is her baseline.  She recently changed to a new scented pad and her daughter feels this may be causing problems.  Patient confirms that she changes the pad frequently to avoid moisture against her skin.       Dysuria   This is a new problem. The current episode started yesterday. The problem occurs every urination. The problem has been unchanged. The quality of the pain is described as burning. The pain is at a severity of 5/10. The pain is moderate. There has been no fever. She is Not sexually active. There is No history of pyelonephritis. Associated symptoms include frequency, hesitancy and urgency. Pertinent negatives include no chills, discharge, nausea, vomiting or constipation. She has tried nothing for the symptoms. The treatment provided no relief. Her past medical history is significant for hypertension and recurrent UTIs. There is no history of diabetes mellitus, kidney stones or STD.       Constitution: Negative for activity change, appetite change, chills and fever.   Gastrointestinal:  Negative for nausea, vomiting and constipation.   Genitourinary:  Positive for dysuria, frequency and urgency.   Psychiatric/Behavioral:  Negative for confusion.       Objective:     Physical Exam   Constitutional: She is oriented to person, place, and time.  Non-toxic appearance. She does not appear ill. No distress. normal  HENT:   Head: Normocephalic and atraumatic. "   Ears:   Right Ear: External ear normal.   Left Ear: External ear normal.   Nose: Nose normal.   Eyes: Conjunctivae are normal.   Cardiovascular: Normal rate.   Pulmonary/Chest: Effort normal.   Abdominal: Normal appearance. Soft. There is no abdominal tenderness. There is no left CVA tenderness and no right CVA tenderness.   Neurological: She is alert and oriented to person, place, and time.   Skin: Skin is warm, dry and not diaphoretic. Capillary refill takes less than 2 seconds.   Psychiatric: Her behavior is normal.   Nursing note and vitals reviewed.      Assessment:     1. Urinary tract infection without hematuria, site unspecified    2. Vaginal itching    3. Elevated blood pressure reading      Results for orders placed or performed in visit on 07/07/25   POCT Urinalysis(Instrument)    Collection Time: 07/07/25 10:38 AM   Result Value Ref Range    Color, POC UA Yellow Yellow, Straw, Colorless    Clarity, POC UA Clear Clear    Glucose, POC UA Negative Negative    Bilirubin, POC UA Negative Negative    Ketones, POC UA Negative Negative    Spec Grav POC UA 1.015 1.005 - 1.030    Blood, POC UA Negative Negative    pH, POC UA 7.0 5.0 - 8.0    Protein, POC UA Trace (A) Negative    Urobilinogen, POC UA 1.0 <=1.0    Nitrite, POC UA Negative Negative    WBC, POC UA Trace (A) Negative       Plan:       Urinary tract infection without hematuria, site unspecified  -     POCT Urinalysis(Instrument)  -     nitrofurantoin, macrocrystal-monohydrate, (MACROBID) 100 MG capsule; Take 1 capsule (100 mg total) by mouth 2 (two) times daily. for 5 days  Dispense: 10 capsule; Refill: 0    Vaginal itching  -     Vaginosis Screen by DNA Probe  -     fluconazole (DIFLUCAN) 150 MG Tab; Take 1 tablet (150 mg total) by mouth once daily. for 2 doses  Dispense: 2 tablet; Refill: 0    Elevated blood pressure reading      Patient Instructions   1.  Take the Diflucan tablet today.  Macrobid has been prescribed for a possible urinary tract  infection.  I would like to wait on the results of your culture before you begin taking this antibiotic.  However, if you have any worsening symptoms, please start taking the antibiotic as directed.  Consider following up with your urologist, if the bladder spasms continue.  See attached handout for more information regarding your condition and how to manage it.  2.  Rest and keep yourself/patient well hydrated.  3.  You can alternate Tylenol and Motrin every 4-6 hours for fever above 100.4F and/or pain.   4. You should schedule a follow-up appointment with your Primary Care Provider for recheck in 2-3 days or as directed at this visit.   5.  If your condition fails to improve in a timely manner, you should receive another evaluation by your Primary Care Provider to discuss your concerns or return to urgent care for a recheck.  If your condition worsens at any time, you should report immediately to your nearest Emergency Department for further evaluation. **You must understand that you have received Urgent Care treatment only and that you may be released before all of your medical problems are known or treated. You, the patient, are responsible to arrange for follow-up care as instructed.

## 2025-07-10 LAB
BACTERIA UR CULT: ABNORMAL
BACTERIAL VAGINOSIS DNA (OHS): DETECTED
CANDIDA GLABRATA/KRUSEI DNA (OHS): NOT DETECTED
CANDIDA SPECIES DNA (OHS): DETECTED
TRICHOMONAS VAGINALIS DNA (OHS): NOT DETECTED

## 2025-07-11 ENCOUNTER — TELEPHONE (OUTPATIENT)
Dept: URGENT CARE | Facility: CLINIC | Age: 80
End: 2025-07-11
Payer: MEDICARE

## 2025-07-11 RX ORDER — METRONIDAZOLE 7.5 MG/G
1 GEL VAGINAL NIGHTLY
Qty: 70 G | Refills: 0 | Status: SHIPPED | OUTPATIENT
Start: 2025-07-11 | End: 2025-07-16

## 2025-07-11 NOTE — TELEPHONE ENCOUNTER
Spoke with pt. Discussed lab results. Positive for Bacterial Vag and Candida. Urine culture +Ecoli. Treated appropriatelt for E-coli and Candida at initial visit. Rx for BV (Metronidazole gel) sent to pharmacy.